# Patient Record
Sex: FEMALE | Race: WHITE | NOT HISPANIC OR LATINO | Employment: UNEMPLOYED | URBAN - METROPOLITAN AREA
[De-identification: names, ages, dates, MRNs, and addresses within clinical notes are randomized per-mention and may not be internally consistent; named-entity substitution may affect disease eponyms.]

---

## 2023-02-11 PROBLEM — B00.9 HERPES: Status: ACTIVE | Noted: 2023-02-11

## 2023-02-11 PROBLEM — S91.101A OPEN WOUND OF RIGHT GREAT TOE: Status: ACTIVE | Noted: 2023-02-11

## 2023-02-11 PROBLEM — R14.0 ABDOMINAL BLOATING: Status: ACTIVE | Noted: 2023-02-11

## 2023-02-11 PROBLEM — N63.20 BREAST MASS, LEFT: Status: ACTIVE | Noted: 2023-02-11

## 2023-02-11 PROBLEM — S91.339A: Status: ACTIVE | Noted: 2023-02-11

## 2023-02-11 PROBLEM — M21.612 BILATERAL BUNIONS: Status: ACTIVE | Noted: 2023-02-11

## 2023-02-11 PROBLEM — R10.84 GENERALIZED ABDOMINAL PAIN: Status: ACTIVE | Noted: 2023-02-11

## 2023-02-11 PROBLEM — L03.90 CELLULITIS: Status: ACTIVE | Noted: 2023-02-11

## 2023-02-11 PROBLEM — L70.9 ACNE: Status: ACTIVE | Noted: 2023-02-11

## 2023-02-11 PROBLEM — R92.8 ABNORMAL MAMMOGRAM: Status: ACTIVE | Noted: 2023-02-11

## 2023-02-11 PROBLEM — S99.929A FOOT INJURY: Status: ACTIVE | Noted: 2023-02-11

## 2023-02-11 PROBLEM — R06.83 SNORING: Status: ACTIVE | Noted: 2023-02-11

## 2023-02-11 PROBLEM — M21.611 BILATERAL BUNIONS: Status: ACTIVE | Noted: 2023-02-11

## 2023-03-14 ENCOUNTER — OFFICE VISIT (OUTPATIENT)
Dept: PRIMARY CARE | Facility: CLINIC | Age: 48
End: 2023-03-14
Payer: COMMERCIAL

## 2023-03-14 VITALS
SYSTOLIC BLOOD PRESSURE: 110 MMHG | TEMPERATURE: 97.7 F | BODY MASS INDEX: 22.56 KG/M2 | WEIGHT: 157.6 LBS | OXYGEN SATURATION: 98 % | RESPIRATION RATE: 16 BRPM | HEIGHT: 70 IN | HEART RATE: 76 BPM | DIASTOLIC BLOOD PRESSURE: 80 MMHG

## 2023-03-14 DIAGNOSIS — Z00.00 ENCOUNTER FOR ANNUAL PHYSICAL EXAM: ICD-10-CM

## 2023-03-14 DIAGNOSIS — M79.10 MYALGIA: Primary | ICD-10-CM

## 2023-03-14 DIAGNOSIS — M79.645 CHRONIC PAIN OF LEFT THUMB: ICD-10-CM

## 2023-03-14 DIAGNOSIS — M79.672 LEFT FOOT PAIN: ICD-10-CM

## 2023-03-14 DIAGNOSIS — N95.1 PERIMENOPAUSAL: ICD-10-CM

## 2023-03-14 DIAGNOSIS — G89.29 CHRONIC PAIN OF LEFT THUMB: ICD-10-CM

## 2023-03-14 PROCEDURE — 1036F TOBACCO NON-USER: CPT | Performed by: FAMILY MEDICINE

## 2023-03-14 PROCEDURE — 99386 PREV VISIT NEW AGE 40-64: CPT | Performed by: FAMILY MEDICINE

## 2023-03-14 PROCEDURE — 99203 OFFICE O/P NEW LOW 30 MIN: CPT | Performed by: FAMILY MEDICINE

## 2023-03-14 RX ORDER — VALACYCLOVIR HYDROCHLORIDE 500 MG/1
TABLET, FILM COATED ORAL
COMMUNITY
Start: 2017-07-21

## 2023-03-14 RX ORDER — TRIFAROTENE 50 UG/G
CREAM TOPICAL
COMMUNITY
Start: 2023-01-31 | End: 2024-02-13 | Stop reason: ALTCHOICE

## 2023-03-14 RX ORDER — DOXYCYCLINE 100 MG/1
100 CAPSULE ORAL 2 TIMES DAILY
COMMUNITY
Start: 2022-12-15 | End: 2024-02-13 | Stop reason: ALTCHOICE

## 2023-03-14 RX ORDER — METRONIDAZOLE 7.5 MG/G
CREAM TOPICAL
COMMUNITY
Start: 2023-03-09 | End: 2023-08-15 | Stop reason: WASHOUT

## 2023-03-14 ASSESSMENT — PATIENT HEALTH QUESTIONNAIRE - PHQ9
6. FEELING BAD ABOUT YOURSELF - OR THAT YOU ARE A FAILURE OR HAVE LET YOURSELF OR YOUR FAMILY DOWN: NOT AT ALL
2. FEELING DOWN, DEPRESSED OR HOPELESS: NOT AT ALL
5. POOR APPETITE OR OVEREATING: NOT AT ALL
4. FEELING TIRED OR HAVING LITTLE ENERGY: NOT AT ALL
8. MOVING OR SPEAKING SO SLOWLY THAT OTHER PEOPLE COULD HAVE NOTICED. OR THE OPPOSITE, BEING SO FIGETY OR RESTLESS THAT YOU HAVE BEEN MOVING AROUND A LOT MORE THAN USUAL: NOT AT ALL
SUM OF ALL RESPONSES TO PHQ QUESTIONS 1-9: 0
7. TROUBLE CONCENTRATING ON THINGS, SUCH AS READING THE NEWSPAPER OR WATCHING TELEVISION: NOT AT ALL
10. IF YOU CHECKED OFF ANY PROBLEMS, HOW DIFFICULT HAVE THESE PROBLEMS MADE IT FOR YOU TO DO YOUR WORK, TAKE CARE OF THINGS AT HOME, OR GET ALONG WITH OTHER PEOPLE: NOT DIFFICULT AT ALL
1. LITTLE INTEREST OR PLEASURE IN DOING THINGS: NOT AT ALL
3. TROUBLE FALLING OR STAYING ASLEEP OR SLEEPING TOO MUCH: NOT AT ALL
9. THOUGHTS THAT YOU WOULD BE BETTER OFF DEAD, OR OF HURTING YOURSELF: NOT AT ALL
SUM OF ALL RESPONSES TO PHQ9 QUESTIONS 1 AND 2: 0

## 2023-03-14 ASSESSMENT — ANXIETY QUESTIONNAIRES
6. BECOMING EASILY ANNOYED OR IRRITABLE: NOT AT ALL
4. TROUBLE RELAXING: NOT AT ALL
7. FEELING AFRAID AS IF SOMETHING AWFUL MIGHT HAPPEN: NOT AT ALL
2. NOT BEING ABLE TO STOP OR CONTROL WORRYING: NOT AT ALL
3. WORRYING TOO MUCH ABOUT DIFFERENT THINGS: NOT AT ALL
IF YOU CHECKED OFF ANY PROBLEMS ON THIS QUESTIONNAIRE, HOW DIFFICULT HAVE THESE PROBLEMS MADE IT FOR YOU TO DO YOUR WORK, TAKE CARE OF THINGS AT HOME, OR GET ALONG WITH OTHER PEOPLE: NOT DIFFICULT AT ALL
1. FEELING NERVOUS, ANXIOUS, OR ON EDGE: NOT AT ALL
GAD7 TOTAL SCORE: 0
5. BEING SO RESTLESS THAT IT IS HARD TO SIT STILL: NOT AT ALL

## 2023-03-14 NOTE — PROGRESS NOTES
"Subjective   Dilip Barnes is a 48 y.o. female who presents for Establish Care (PT being seen for NPV to establish care with new PCP ).  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 No cycle since dec until today was going to see gyn but cancelled since menses came     L foot pain occ    Went to PT, mesotherapy, chiro for months d/t herniated disc in back since oct   Current Outpatient Medications on File Prior to Visit   Medication Sig Dispense Refill    Aklief 0.005 % cream Apply half a pump to acne prone areas every other night as tolerated      doxycycline (Monodox) 100 mg capsule Take 1 capsule (100 mg) by mouth in the morning and 1 capsule (100 mg) before bedtime.      metroNIDAZOLE (Metrocream) 0.75 % cream Apply topically.      valACYclovir (Valtrex) 500 mg tablet Take by mouth.       No current facility-administered medications on file prior to visit.       Objective   /80   Pulse 76   Temp 36.5 °C (97.7 °F)   Resp 16   Ht 1.774 m (5' 9.84\")   Wt 71.5 kg (157 lb 9.6 oz)   SpO2 98%   BMI 22.72 kg/m²    Physical Exam  General: NAD  HEENT:NCAT, PERRLA, nml OP  Neck: no cervical NATHANIEL  Heart: RRR no murmur, no edema   Lungs: CTA b/l, no wheeze or rhonchi   GI: abd soft, nontender, nondistended.   MSK: no c/c/e. nml gait   PAIN AT LEFT THUMB BASE MILDLY + FINKELSTEIN, N OSWELLING OR WARMTH  NO REPRODUCIBLE L FOOT PAIN OR DEFORMITY   Skin: warm and dry  Psych: cooperative, appropriate affect  Neuro: speech clear. A&Ox3  Assessment/Plan   Problem List Items Addressed This Visit          Other    Encounter for annual physical exam     Other " Visit Diagnoses       Myalgia    -  Primary    Chronic pain of left thumb        Left foot pain            CPE:overall doing well. Discussed diet/ex changes  BMI: 22 at goal   VACC: reviewed  COLON CA SCRN: UTD rpt 11/2032, [facility]   LABS: ordered  PAP: utd per gyn, last pap was ASCUS neg HPV 8/2021  MAMMO: UTD rpt 9/2023 (done at [facility])  DEXA: never  RTC yearly or prn     L thumb pain: ?OA, check xray    Myalgias: check AI labs    L foot pain: mild. Monitor

## 2023-03-16 ENCOUNTER — APPOINTMENT (OUTPATIENT)
Dept: LAB | Facility: LAB | Age: 48
End: 2023-03-16
Payer: COMMERCIAL

## 2023-03-16 LAB
ALANINE AMINOTRANSFERASE (SGPT) (U/L) IN SER/PLAS: 11 U/L (ref 7–45)
ALBUMIN (G/DL) IN SER/PLAS: 4.5 G/DL (ref 3.4–5)
ALKALINE PHOSPHATASE (U/L) IN SER/PLAS: 49 U/L (ref 33–110)
ANION GAP IN SER/PLAS: 11 MMOL/L (ref 10–20)
ASPARTATE AMINOTRANSFERASE (SGOT) (U/L) IN SER/PLAS: 15 U/L (ref 9–39)
BASOPHILS (10*3/UL) IN BLOOD BY AUTOMATED COUNT: NORMAL
BASOPHILS/100 LEUKOCYTES IN BLOOD BY AUTOMATED COUNT: NORMAL
BILIRUBIN TOTAL (MG/DL) IN SER/PLAS: 0.7 MG/DL (ref 0–1.2)
C REACTIVE PROTEIN (MG/L) IN SER/PLAS: <0.1 MG/DL
CALCIDIOL (25 OH VITAMIN D3) (NG/ML) IN SER/PLAS: 32 NG/ML
CALCIUM (MG/DL) IN SER/PLAS: 9.4 MG/DL (ref 8.6–10.3)
CARBON DIOXIDE, TOTAL (MMOL/L) IN SER/PLAS: 29 MMOL/L (ref 21–32)
CHLORIDE (MMOL/L) IN SER/PLAS: 101 MMOL/L (ref 98–107)
CHOLESTEROL (MG/DL) IN SER/PLAS: 178 MG/DL (ref 0–199)
CHOLESTEROL IN HDL (MG/DL) IN SER/PLAS: 69.6 MG/DL
CHOLESTEROL/HDL RATIO: 2.6
COBALAMIN (VITAMIN B12) (PG/ML) IN SER/PLAS: 490 PG/ML (ref 211–911)
CREATININE (MG/DL) IN SER/PLAS: 0.78 MG/DL (ref 0.5–1.05)
EOSINOPHILS (10*3/UL) IN BLOOD BY AUTOMATED COUNT: NORMAL
EOSINOPHILS/100 LEUKOCYTES IN BLOOD BY AUTOMATED COUNT: NORMAL
ERYTHROCYTE DISTRIBUTION WIDTH (RATIO) BY AUTOMATED COUNT: NORMAL
ERYTHROCYTE MEAN CORPUSCULAR HEMOGLOBIN CONCENTRATION (G/DL) BY AUTOMATED: NORMAL
ERYTHROCYTE MEAN CORPUSCULAR VOLUME (FL) BY AUTOMATED COUNT: NORMAL
ERYTHROCYTES (10*6/UL) IN BLOOD BY AUTOMATED COUNT: NORMAL
ESTIMATED AVERAGE GLUCOSE FOR HBA1C: 97 MG/DL
ESTRADIOL (PG/ML) IN SER/PLAS: 148 PG/ML
FOLLITROPIN (IU/L) IN SER/PLAS: 15.3 IU/L
GFR FEMALE: >90 ML/MIN/1.73M2
GLUCOSE (MG/DL) IN SER/PLAS: 90 MG/DL (ref 74–99)
HEMATOCRIT (%) IN BLOOD BY AUTOMATED COUNT: NORMAL
HEMOGLOBIN (G/DL) IN BLOOD: NORMAL
HEMOGLOBIN A1C/HEMOGLOBIN TOTAL IN BLOOD: 5 %
IMMATURE GRANULOCYTES/100 LEUKOCYTES IN BLOOD BY AUTOMATED COUNT: NORMAL
IRON (UG/DL) IN SER/PLAS: 148 UG/DL (ref 35–150)
IRON BINDING CAPACITY (UG/DL) IN SER/PLAS: 372 UG/DL (ref 240–445)
IRON SATURATION (%) IN SER/PLAS: 40 % (ref 25–45)
LDL: 94 MG/DL (ref 0–99)
LEUKOCYTES (10*3/UL) IN BLOOD BY AUTOMATED COUNT: NORMAL
LUTEINIZING HORMONE (IU/ML) IN SER/PLAS: 5.8 IU/L
LYMPHOCYTES (10*3/UL) IN BLOOD BY AUTOMATED COUNT: NORMAL
LYMPHOCYTES/100 LEUKOCYTES IN BLOOD BY AUTOMATED COUNT: NORMAL
MANUAL DIFFERENTIAL Y/N: NORMAL
MONOCYTES (10*3/UL) IN BLOOD BY AUTOMATED COUNT: NORMAL
MONOCYTES/100 LEUKOCYTES IN BLOOD BY AUTOMATED COUNT: NORMAL
NEUTROPHILS (10*3/UL) IN BLOOD BY AUTOMATED COUNT: NORMAL
NEUTROPHILS/100 LEUKOCYTES IN BLOOD BY AUTOMATED COUNT: NORMAL
NRBC (PER 100 WBCS) BY AUTOMATED COUNT: NORMAL
PLATELETS (10*3/UL) IN BLOOD AUTOMATED COUNT: NORMAL
POTASSIUM (MMOL/L) IN SER/PLAS: 4.5 MMOL/L (ref 3.5–5.3)
PROTEIN TOTAL: 7 G/DL (ref 6.4–8.2)
RHEUMATOID FACTOR (IU/ML) IN SERUM OR PLASMA: <10 IU/ML (ref 0–15)
SEDIMENTATION RATE, ERYTHROCYTE: NORMAL
SODIUM (MMOL/L) IN SER/PLAS: 136 MMOL/L (ref 136–145)
THYROTROPIN (MIU/L) IN SER/PLAS BY DETECTION LIMIT <= 0.05 MIU/L: 2.1 MIU/L (ref 0.44–3.98)
TRIGLYCERIDE (MG/DL) IN SER/PLAS: 72 MG/DL (ref 0–149)
UREA NITROGEN (MG/DL) IN SER/PLAS: 9 MG/DL (ref 6–23)
VLDL: 14 MG/DL (ref 0–40)

## 2023-03-17 LAB
ANTI-CENTROMERE: <0.2 AI
ANTI-CHROMATIN: <0.2 AI
ANTI-DNA (DS): <1 IU/ML
ANTI-JO-1 IGG: <0.2 AI
ANTI-NUCLEAR ANTIBODY (ANA): NEGATIVE
ANTI-RIBOSOMAL P: <0.2 AI
ANTI-RNP: 0.2 AI
ANTI-SCL-70: 0.2 AI
ANTI-SM/RNP: <0.2 AI
ANTI-SM: <0.2 AI
ANTI-SSA: <0.2 AI
ANTI-SSB: <0.2 AI
IGG (MG/DL) IN SER/PLAS: 1010 MG/DL (ref 700–1600)

## 2023-03-20 LAB — CITRULLINE ANTIBODY, IGG: 4 UNITS (ref 0–19)

## 2023-08-15 ENCOUNTER — OFFICE VISIT (OUTPATIENT)
Dept: PRIMARY CARE | Facility: CLINIC | Age: 48
End: 2023-08-15
Payer: COMMERCIAL

## 2023-08-15 VITALS
OXYGEN SATURATION: 98 % | TEMPERATURE: 97.7 F | DIASTOLIC BLOOD PRESSURE: 72 MMHG | BODY MASS INDEX: 21.76 KG/M2 | HEIGHT: 70 IN | SYSTOLIC BLOOD PRESSURE: 100 MMHG | RESPIRATION RATE: 18 BRPM | WEIGHT: 152 LBS | HEART RATE: 62 BPM

## 2023-08-15 DIAGNOSIS — K43.9 VENTRAL HERNIA WITHOUT OBSTRUCTION OR GANGRENE: Primary | ICD-10-CM

## 2023-08-15 PROCEDURE — 1036F TOBACCO NON-USER: CPT | Performed by: FAMILY MEDICINE

## 2023-08-15 PROCEDURE — 99213 OFFICE O/P EST LOW 20 MIN: CPT | Performed by: FAMILY MEDICINE

## 2023-08-15 RX ORDER — CYANOCOBALAMIN (VITAMIN B-12) 1000 MCG
1 TABLET, EXTENDED RELEASE ORAL DAILY
COMMUNITY
Start: 2022-02-17

## 2023-08-15 RX ORDER — TURMERIC 400 MG
1 CAPSULE ORAL
COMMUNITY

## 2023-08-15 NOTE — PROGRESS NOTES
"Subjective   Dilip Barnes is a 48 y.o. female who presents for Follow-up (Abd hernia few weeks).  HPI  R central/mid abd fullness x few wks, worse when changing certain positions. No pain. No changes in bowels, urinary Sx, fever, changes in appetite.   Current Outpatient Medications on File Prior to Visit   Medication Sig Dispense Refill    L. acidophilus/Bifid. animalis (Daily Probiotic) 2.5 billion cell capsule Take 1 capsule by mouth once daily.      zinc-colostrum-egg-[id number] 15 mg-50 mg- 1 mg-1 mg capsule Take 2 capsules by mouth once daily.      Aklief 0.005 % cream Apply half a pump to acne prone areas every other night as tolerated      doxycycline (Monodox) 100 mg capsule Take 1 capsule (100 mg) by mouth in the morning and 1 capsule (100 mg) before bedtime.      turmeric 400 mg capsule Take 1 capsule by mouth once daily.      valACYclovir (Valtrex) 500 mg tablet Take by mouth.      [DISCONTINUED] metroNIDAZOLE (Metrocream) 0.75 % cream Apply topically.       No current facility-administered medications on file prior to visit.                  Objective   /72   Pulse 62   Temp 36.5 °C (97.7 °F)   Resp 18   Ht 1.778 m (5' 10\")   Wt 68.9 kg (152 lb)   SpO2 98%   BMI 21.81 kg/m²    Physical Exam  General: NAD  HEENT:NCAT, PERRLA, nml OP  Neck: no cervical NATHANIEL  Heart: RRR no murmur, no edema   Lungs: CTA b/l, no wheeze or rhonchi   GI: abd soft, nontender, nondistended. No reproducible hernia on exam at rest or valsalva   MSK: no c/c/e. nml gait   Skin: warm and dry  Psych: cooperative, appropriate affect  Neuro: speech clear. A&Ox3  Assessment/Plan   Problem List Items Addressed This Visit    None  Visit Diagnoses       Ventral hernia without obstruction or gangrene    -  Primary  -mildly symptomatic. No reproducible on exam. Discussed pro/con of imaging  -will consider/monitor Sx and if worsens pt will spring CT   -red flag signs and return precautions discussed.   -no activity restrictions.  "    Relevant Orders    CT abdomen pelvis w IV contrast

## 2023-12-27 ENCOUNTER — APPOINTMENT (OUTPATIENT)
Dept: RADIOLOGY | Facility: CLINIC | Age: 48
End: 2023-12-27
Payer: COMMERCIAL

## 2024-01-16 ENCOUNTER — ANCILLARY PROCEDURE (OUTPATIENT)
Dept: RADIOLOGY | Facility: CLINIC | Age: 49
End: 2024-01-16
Payer: COMMERCIAL

## 2024-01-16 VITALS — HEIGHT: 70 IN | WEIGHT: 151.9 LBS | BODY MASS INDEX: 21.75 KG/M2

## 2024-01-16 DIAGNOSIS — Z12.31 ENCOUNTER FOR SCREENING MAMMOGRAM FOR MALIGNANT NEOPLASM OF BREAST: ICD-10-CM

## 2024-01-16 PROCEDURE — 77067 SCR MAMMO BI INCL CAD: CPT | Performed by: RADIOLOGY

## 2024-01-16 PROCEDURE — 77063 BREAST TOMOSYNTHESIS BI: CPT | Performed by: RADIOLOGY

## 2024-01-16 PROCEDURE — 77067 SCR MAMMO BI INCL CAD: CPT

## 2024-01-17 ENCOUNTER — HOSPITAL ENCOUNTER (OUTPATIENT)
Dept: RADIOLOGY | Facility: EXTERNAL LOCATION | Age: 49
Discharge: HOME | End: 2024-01-17

## 2024-02-13 ENCOUNTER — OFFICE VISIT (OUTPATIENT)
Dept: PRIMARY CARE | Facility: CLINIC | Age: 49
End: 2024-02-13
Payer: COMMERCIAL

## 2024-02-13 VITALS
SYSTOLIC BLOOD PRESSURE: 115 MMHG | BODY MASS INDEX: 21.76 KG/M2 | RESPIRATION RATE: 16 BRPM | HEIGHT: 70 IN | HEART RATE: 83 BPM | WEIGHT: 152 LBS | TEMPERATURE: 97.3 F | OXYGEN SATURATION: 94 % | DIASTOLIC BLOOD PRESSURE: 70 MMHG

## 2024-02-13 DIAGNOSIS — N93.9 ABNORMAL UTERINE BLEEDING (AUB): ICD-10-CM

## 2024-02-13 DIAGNOSIS — R53.82 CHRONIC FATIGUE: ICD-10-CM

## 2024-02-13 DIAGNOSIS — Z00.00 ENCOUNTER FOR ANNUAL PHYSICAL EXAM: ICD-10-CM

## 2024-02-13 DIAGNOSIS — M26.621 TMJ TENDERNESS, RIGHT: ICD-10-CM

## 2024-02-13 DIAGNOSIS — N80.03 ADENOMYOSIS: ICD-10-CM

## 2024-02-13 DIAGNOSIS — K43.9 ABDOMINAL WALL HERNIA: ICD-10-CM

## 2024-02-13 DIAGNOSIS — N95.1 PERIMENOPAUSAL: Primary | ICD-10-CM

## 2024-02-13 PROCEDURE — 1036F TOBACCO NON-USER: CPT | Performed by: FAMILY MEDICINE

## 2024-02-13 PROCEDURE — 99214 OFFICE O/P EST MOD 30 MIN: CPT | Performed by: FAMILY MEDICINE

## 2024-02-13 NOTE — PROGRESS NOTES
"Subjective   Dilip Barnes is a 49 y.o. female who presents for Annual Exam.  HPI      Wants tested for \"everything\" taking a lot of supp.     Using a Annas wild yam cream from [place] to help w cycles.  Period restarted 3 d ago after NO MENSES SINCE NOV. Used the cream for about a mo. Did several rounds of progresterone last yr and did not like the SE prefers natural methods.     R jaw pain w clenching. Tx TMJ yrs ago.     Tries to antiinflamm diet, castor oil helps w hands.     Bump in L lower post ribs for many yrs that comes out w valsalva   Current Outpatient Medications on File Prior to Visit   Medication Sig Dispense Refill    L. acidophilus/Bifid. animalis (Daily Probiotic) 2.5 billion cell capsule Take 1 capsule by mouth once daily.      turmeric 400 mg capsule Take 1 capsule by mouth once daily.      zinc-colostrum-egg-[id number] 15 mg-50 mg- 1 mg-1 mg capsule Take 2 capsules by mouth once daily.      valACYclovir (Valtrex) 500 mg tablet Take by mouth.      [DISCONTINUED] Aklief 0.005 % cream Apply half a pump to acne prone areas every other night as tolerated      [DISCONTINUED] doxycycline (Monodox) 100 mg capsule Take 1 capsule (100 mg) by mouth in the morning and 1 capsule (100 mg) before bedtime.       No current facility-administered medications on file prior to visit.                  Objective   /70 (BP Location: Left arm)   Pulse 83   Temp 36.3 °C (97.3 °F)   Resp 16   Ht 1.788 m (5' 10.39\")   Wt 68.9 kg (152 lb)   SpO2 94%   BMI 21.57 kg/m²    Physical Exam  General: NAD  HEENT:NCAT, PERRLA, nml OP  Neck: no cervical NATHANIEL  Heart: RRR no murmur, no edema   Lungs: CTA b/l, no wheeze or rhonchi   GI: abd soft, nontender, nondistended.   Fullness in posterior abd/lower rib area w valsalva soft and not present at rest   MSK: no c/c/e. nml gait   R TMJ tenderness w ROM and palpation   Skin: warm and dry  Psych: cooperative, appropriate affect  Neuro: speech clear. " A&Ox3  Assessment/Plan   Problem List Items Addressed This Visit       Encounter for annual physical exam  CPE 1-2 mo labs prior     Relevant Orders    TSH with reflex to Free T4 if abnormal    Lipid Panel    CBC and Auto Differential    Hemoglobin A1C    Comprehensive Metabolic Panel     Other Visit Diagnoses       Perimenopausal    -  Primary  Emely yessenia vs AUB   EMBx last yr nml   TVUS uterine adenomyosis 4/2023   Check hormone levels  If not in perimeno range, will do TVUS   Offered GYN RF will hold off for now     Relevant Orders    FSH & LH    Estradiol    Adenomyosis      Could be contributing to AUB      Abnormal uterine bleeding (AUB)      See above     Relevant Orders    FSH & LH    Estradiol    TSH with reflex to Free T4 if abnormal    Iron and TIBC    Chronic fatigue      Check Vit levels  Not on Fe     Relevant Orders    Vitamin D 25 hydroxy    Ferritin    Vitamin B12    R TMJ dysfnc: rec jaw rest, voltaren gel/martinez oil,  nsaids prn

## 2024-02-27 NOTE — PROGRESS NOTES
Subjective   Patient ID: Dilip Barnes is a 49 y.o. female who presents for irregular periods and annual exam.    PAP 8-13-21 ASCUS, HPV NEG  MAMMO 1-16-24  DEXA NEVER  COLON 11-16-22  GARDASIL never declines.    H/O AUB, was on Provera this past year to cycle.   4/6/23 ultrasound-adenomyosis, 9 mm.  4/17/23 dyssynchronous secretory phase endometrium.    Did 2 rounds of progesterone, then stopped and had regular cycles. Didn't get period in December/January. Started a Yam cream. Patient has gone holistic. Got a little blood after using it. First few days light then 2/14/24 a regular period until today.      Mirena out Nov. 2014. Vasectomy.      Off Valtrex, no outbreaks.  doesn't have it. Uses peppermint oil.     HEalthy year. Kids 5th, 6th grade. Twins in college([employer]).     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       H/O AUB, was on Provera this past year to cycle.   4/6/23 ultrasound-adenomyosis, 9 mm.  4/17/23 dyssynchronous secretory phase endometrium.      Review of Systems   All other systems reviewed and are negative.      Objective   Constitutional: Alert and in no acute distress. Well developed, well nourished.  Neck: no neck asymmetry. Supple and thyroid not enlarged and there were no palpable thyroid nodules.  Chest: Breasts normal appearance, no nipple discharge and no skin changes and palpation of breasts and axillae: no palpable mass and no axillary lymphadenopathy.  Abdomen: soft nontender; no abdominal mass palpated, no organomegaly and no hernias.  Genitourinary: external genitalia: normal, no inguinal lymphadenopathy, Bartholin's urethral and [place]'s glands: normal, urethra: normal and bladder: normal on palpation.  Vagina: normal.  Cervix: normal.  Uterus: normal.   Right adnexa/parametria: normal.  Left adnexa/parametria: normal.  Skin: normal skin color and pigmentation, normal skin turgor and no rash.  Psychiatric: alert and oriented x 3, affect normal to  patient baseline and mood appropriate.     Assessment/Plan   -PAP-HPV  -angelica-neeta  -Patient willing to try cycling with Progesterone 200mg. Testing done this past year will not repeat unless she continues to have issues.

## 2024-02-28 ENCOUNTER — LAB (OUTPATIENT)
Dept: LAB | Facility: LAB | Age: 49
End: 2024-02-28
Payer: COMMERCIAL

## 2024-02-28 DIAGNOSIS — N93.9 ABNORMAL UTERINE BLEEDING (AUB): ICD-10-CM

## 2024-02-28 DIAGNOSIS — N95.1 PERIMENOPAUSAL: ICD-10-CM

## 2024-02-28 DIAGNOSIS — R53.82 CHRONIC FATIGUE: ICD-10-CM

## 2024-02-28 DIAGNOSIS — M79.10 MYALGIA: ICD-10-CM

## 2024-02-28 DIAGNOSIS — Z00.00 ENCOUNTER FOR ANNUAL PHYSICAL EXAM: ICD-10-CM

## 2024-02-28 LAB
25(OH)D3 SERPL-MCNC: 34 NG/ML (ref 30–100)
ALBUMIN SERPL BCP-MCNC: 4.2 G/DL (ref 3.4–5)
ALP SERPL-CCNC: 51 U/L (ref 33–110)
ALT SERPL W P-5'-P-CCNC: 16 U/L (ref 7–45)
ANION GAP SERPL CALC-SCNC: 16 MMOL/L (ref 10–20)
AST SERPL W P-5'-P-CCNC: 20 U/L (ref 9–39)
BASOPHILS # BLD AUTO: 0.04 X10*3/UL (ref 0–0.1)
BASOPHILS NFR BLD AUTO: 0.6 %
BILIRUB SERPL-MCNC: 0.5 MG/DL (ref 0–1.2)
BUN SERPL-MCNC: 9 MG/DL (ref 6–23)
CALCIUM SERPL-MCNC: 9.3 MG/DL (ref 8.6–10.6)
CCP IGG SERPL-ACNC: <1 U/ML
CHLORIDE SERPL-SCNC: 101 MMOL/L (ref 98–107)
CHOLEST SERPL-MCNC: 187 MG/DL (ref 0–199)
CHOLESTEROL/HDL RATIO: 2.4
CO2 SERPL-SCNC: 26 MMOL/L (ref 21–32)
CREAT SERPL-MCNC: 0.65 MG/DL (ref 0.5–1.05)
CRP SERPL-MCNC: <0.1 MG/DL
EGFRCR SERPLBLD CKD-EPI 2021: >90 ML/MIN/1.73M*2
EOSINOPHIL # BLD AUTO: 0.2 X10*3/UL (ref 0–0.7)
EOSINOPHIL NFR BLD AUTO: 3.1 %
ERYTHROCYTE [DISTWIDTH] IN BLOOD BY AUTOMATED COUNT: 13 % (ref 11.5–14.5)
ERYTHROCYTE [SEDIMENTATION RATE] IN BLOOD BY WESTERGREN METHOD: 3 MM/H (ref 0–20)
EST. AVERAGE GLUCOSE BLD GHB EST-MCNC: 94 MG/DL
ESTRADIOL SERPL-MCNC: 227 PG/ML
FERRITIN SERPL-MCNC: 27 NG/ML (ref 8–150)
FSH SERPL-ACNC: 9 IU/L
GLUCOSE SERPL-MCNC: 79 MG/DL (ref 74–99)
HBA1C MFR BLD: 4.9 %
HCT VFR BLD AUTO: 41.2 % (ref 36–46)
HDLC SERPL-MCNC: 76.6 MG/DL
HGB BLD-MCNC: 13.2 G/DL (ref 12–16)
IMM GRANULOCYTES # BLD AUTO: 0.01 X10*3/UL (ref 0–0.7)
IMM GRANULOCYTES NFR BLD AUTO: 0.2 % (ref 0–0.9)
IRON SATN MFR SERPL: 26 % (ref 25–45)
IRON SERPL-MCNC: 96 UG/DL (ref 35–150)
LDLC SERPL CALC-MCNC: 99 MG/DL
LH SERPL-ACNC: 4.8 IU/L
LYMPHOCYTES # BLD AUTO: 1.91 X10*3/UL (ref 1.2–4.8)
LYMPHOCYTES NFR BLD AUTO: 29.8 %
MCH RBC QN AUTO: 31.2 PG (ref 26–34)
MCHC RBC AUTO-ENTMCNC: 32 G/DL (ref 32–36)
MCV RBC AUTO: 97 FL (ref 80–100)
MONOCYTES # BLD AUTO: 0.62 X10*3/UL (ref 0.1–1)
MONOCYTES NFR BLD AUTO: 9.7 %
NEUTROPHILS # BLD AUTO: 3.62 X10*3/UL (ref 1.2–7.7)
NEUTROPHILS NFR BLD AUTO: 56.6 %
NON HDL CHOLESTEROL: 110 MG/DL (ref 0–149)
NRBC BLD-RTO: 0 /100 WBCS (ref 0–0)
PLATELET # BLD AUTO: 254 X10*3/UL (ref 150–450)
POTASSIUM SERPL-SCNC: 4.5 MMOL/L (ref 3.5–5.3)
PROT SERPL-MCNC: 6.7 G/DL (ref 6.4–8.2)
RBC # BLD AUTO: 4.23 X10*6/UL (ref 4–5.2)
RHEUMATOID FACT SER NEPH-ACNC: <10 IU/ML (ref 0–15)
SODIUM SERPL-SCNC: 138 MMOL/L (ref 136–145)
TIBC SERPL-MCNC: 372 UG/DL (ref 240–445)
TRIGL SERPL-MCNC: 59 MG/DL (ref 0–149)
TSH SERPL-ACNC: 1.18 MIU/L (ref 0.44–3.98)
UIBC SERPL-MCNC: 276 UG/DL (ref 110–370)
VIT B12 SERPL-MCNC: 604 PG/ML (ref 211–911)
VLDL: 12 MG/DL (ref 0–40)
WBC # BLD AUTO: 6.4 X10*3/UL (ref 4.4–11.3)

## 2024-02-28 PROCEDURE — 86140 C-REACTIVE PROTEIN: CPT

## 2024-02-28 PROCEDURE — 80061 LIPID PANEL: CPT

## 2024-02-28 PROCEDURE — 86431 RHEUMATOID FACTOR QUANT: CPT

## 2024-02-28 PROCEDURE — 80053 COMPREHEN METABOLIC PANEL: CPT

## 2024-02-28 PROCEDURE — 86038 ANTINUCLEAR ANTIBODIES: CPT

## 2024-02-28 PROCEDURE — 82607 VITAMIN B-12: CPT

## 2024-02-28 PROCEDURE — 84443 ASSAY THYROID STIM HORMONE: CPT

## 2024-02-28 PROCEDURE — 83540 ASSAY OF IRON: CPT

## 2024-02-28 PROCEDURE — 86200 CCP ANTIBODY: CPT

## 2024-02-28 PROCEDURE — 85652 RBC SED RATE AUTOMATED: CPT

## 2024-02-28 PROCEDURE — 36415 COLL VENOUS BLD VENIPUNCTURE: CPT

## 2024-02-28 PROCEDURE — 82728 ASSAY OF FERRITIN: CPT

## 2024-02-28 PROCEDURE — 83001 ASSAY OF GONADOTROPIN (FSH): CPT

## 2024-02-28 PROCEDURE — 82670 ASSAY OF TOTAL ESTRADIOL: CPT

## 2024-02-28 PROCEDURE — 83550 IRON BINDING TEST: CPT

## 2024-02-28 PROCEDURE — 85025 COMPLETE CBC W/AUTO DIFF WBC: CPT

## 2024-02-28 PROCEDURE — 82306 VITAMIN D 25 HYDROXY: CPT

## 2024-02-28 PROCEDURE — 83002 ASSAY OF GONADOTROPIN (LH): CPT

## 2024-02-28 PROCEDURE — 83036 HEMOGLOBIN GLYCOSYLATED A1C: CPT

## 2024-02-29 ENCOUNTER — OFFICE VISIT (OUTPATIENT)
Dept: OBSTETRICS AND GYNECOLOGY | Facility: CLINIC | Age: 49
End: 2024-02-29
Payer: COMMERCIAL

## 2024-02-29 VITALS
DIASTOLIC BLOOD PRESSURE: 60 MMHG | SYSTOLIC BLOOD PRESSURE: 110 MMHG | WEIGHT: 151 LBS | HEIGHT: 70 IN | BODY MASS INDEX: 21.62 KG/M2

## 2024-02-29 DIAGNOSIS — Z12.31 ENCOUNTER FOR SCREENING MAMMOGRAM FOR BREAST CANCER: ICD-10-CM

## 2024-02-29 DIAGNOSIS — Z12.4 CERVICAL CANCER SCREENING: ICD-10-CM

## 2024-02-29 DIAGNOSIS — Z01.419 WELL WOMAN EXAM WITH ROUTINE GYNECOLOGICAL EXAM: ICD-10-CM

## 2024-02-29 DIAGNOSIS — N93.9 ABNORMAL UTERINE BLEEDING (AUB): Primary | ICD-10-CM

## 2024-02-29 PROCEDURE — 87624 HPV HI-RISK TYP POOLED RSLT: CPT

## 2024-02-29 PROCEDURE — 88142 CYTOPATH C/V THIN LAYER: CPT

## 2024-02-29 PROCEDURE — 99396 PREV VISIT EST AGE 40-64: CPT | Performed by: OBSTETRICS & GYNECOLOGY

## 2024-02-29 PROCEDURE — 1036F TOBACCO NON-USER: CPT | Performed by: OBSTETRICS & GYNECOLOGY

## 2024-02-29 RX ORDER — PROGESTERONE 200 MG/1
200 CAPSULE ORAL NIGHTLY
Qty: 12 CAPSULE | Refills: 11 | Status: SHIPPED | OUTPATIENT
Start: 2024-02-29 | End: 2025-02-28

## 2024-02-29 RX ORDER — FERROUS SULFATE 325(65) MG
325 TABLET, DELAYED RELEASE (ENTERIC COATED) ORAL DAILY
COMMUNITY

## 2024-03-01 LAB — ANA SER QL HEP2 SUBST: NEGATIVE

## 2024-03-08 ENCOUNTER — TELEPHONE (OUTPATIENT)
Dept: OBSTETRICS AND GYNECOLOGY | Facility: CLINIC | Age: 49
End: 2024-03-08
Payer: COMMERCIAL

## 2024-03-08 NOTE — TELEPHONE ENCOUNTER
Called and let patient know what Dr Olvera said - not abnormal to have breakthrough bleeding since recently starting progesterone.   
English

## 2024-03-12 ENCOUNTER — TELEPHONE (OUTPATIENT)
Dept: OBSTETRICS AND GYNECOLOGY | Facility: CLINIC | Age: 49
End: 2024-03-12
Payer: COMMERCIAL

## 2024-03-12 DIAGNOSIS — N93.9 ABNORMAL UTERINE BLEEDING (AUB): Primary | ICD-10-CM

## 2024-03-12 LAB
CYTOLOGY CMNT CVX/VAG CYTO-IMP: NORMAL
HPV HR 12 DNA GENITAL QL NAA+PROBE: NEGATIVE
HPV HR GENOTYPES PNL CVX NAA+PROBE: NEGATIVE
HPV16 DNA SPEC QL NAA+PROBE: NEGATIVE
HPV18 DNA SPEC QL NAA+PROBE: NEGATIVE
LAB AP HPV GENOTYPE QUESTION: YES
LAB AP HPV HR: NORMAL
LABORATORY COMMENT REPORT: NORMAL
LABORATORY COMMENT REPORT: NORMAL
LMP START DATE: NORMAL
PATH REPORT.TOTAL CANCER: NORMAL

## 2024-03-12 NOTE — TELEPHONE ENCOUNTER
TC with pt.   No menses Dec and Jan. Started bleeding in Feb and when saw Dr. Herrera 2/29 was still bleeding. Put on Progesterone for 12 days. Stopped bleeding for 8 days then restarted bleeding 8 days into progesterone. Took last progesterone yesterday. Still bleeding but may be tapering. Pt goes to [place] on 3/25/2024 and is hoping to have this bleeding in control by then. Pt does not like to take meds. Recommend check CBC, wait it out to see if bleeding tapers. Notified pt may increase in next two days since progesterone stopped yesterday. Pt aware she can call me with updates this week and through the weekend.

## 2024-04-04 ENCOUNTER — APPOINTMENT (OUTPATIENT)
Dept: PRIMARY CARE | Facility: CLINIC | Age: 49
End: 2024-04-04
Payer: COMMERCIAL

## 2024-04-11 ENCOUNTER — OFFICE VISIT (OUTPATIENT)
Dept: PRIMARY CARE | Facility: CLINIC | Age: 49
End: 2024-04-11
Payer: COMMERCIAL

## 2024-04-11 VITALS
BODY MASS INDEX: 21.67 KG/M2 | OXYGEN SATURATION: 95 % | SYSTOLIC BLOOD PRESSURE: 103 MMHG | DIASTOLIC BLOOD PRESSURE: 69 MMHG | RESPIRATION RATE: 16 BRPM | WEIGHT: 151.4 LBS | HEART RATE: 83 BPM | HEIGHT: 70 IN | TEMPERATURE: 97.8 F

## 2024-04-11 DIAGNOSIS — F51.02 ADJUSTMENT INSOMNIA: Primary | ICD-10-CM

## 2024-04-11 PROCEDURE — 99214 OFFICE O/P EST MOD 30 MIN: CPT | Performed by: FAMILY MEDICINE

## 2024-04-11 PROCEDURE — 1036F TOBACCO NON-USER: CPT | Performed by: FAMILY MEDICINE

## 2024-04-11 RX ORDER — TRAZODONE HYDROCHLORIDE 50 MG/1
50 TABLET ORAL NIGHTLY PRN
Qty: 30 TABLET | Refills: 1 | Status: SHIPPED | OUTPATIENT
Start: 2024-04-11 | End: 2025-04-11

## 2024-04-11 NOTE — PROGRESS NOTES
"Subjective   Dilip Barnes is a 49 y.o. female who presents for Insomnia (For a week ).  HPI    Not sleeping after red eye last week from [place]. Slept total 9 hrs in last 5 d. Trying all sleep techniques, meditation, sleepy tea. Melatonin. Took hydroxyzine 25 mg last night at 10 pm did not sleep then took another dose at 1 am.   Taking mag gly. Feeling head jolts. Some head pressure L side. No ringing in the ears.   No cough congestion ST. No naps   Current Outpatient Medications on File Prior to Visit   Medication Sig Dispense Refill    ferrous sulfate 325 (65 Fe) MG EC tablet Take 1 tablet by mouth once daily. Do not crush, chew, or split.      L. acidophilus/Bifid. animalis (Daily Probiotic) 2.5 billion cell capsule Take 1 capsule by mouth once daily.      turmeric 400 mg capsule Take 1 capsule by mouth once daily.      progesterone (Prometrium) 200 mg capsule Take 1 capsule (200 mg) by mouth once daily at bedtime. (Patient not taking: Reported on 4/11/2024) 12 capsule 11    valACYclovir (Valtrex) 500 mg tablet Take by mouth.      zinc-colostrum-egg-[id number] 15 mg-50 mg- 1 mg-1 mg capsule Take 2 capsules by mouth once daily.       No current facility-administered medications on file prior to visit.                  Objective   /69 (BP Location: Left arm)   Pulse 83   Temp 36.6 °C (97.8 °F)   Resp 16   Ht 1.778 m (5' 10\")   Wt 68.7 kg (151 lb 6.4 oz)   SpO2 95%   BMI 21.72 kg/m²    Physical Exam  General: NAD  HEENT:NCAT, PERRLA, nml OP, b/l TM clear   Neck: no cervical NATHANIEL  Heart: RRR no murmur, no edema   Lungs: CTA b/l, no wheeze or rhonchi   MSK: no c/c/e. nml gait   Skin: warm and dry  Psych: cooperative, appropriate affect  Neuro: speech clear. A&Ox3  Assessment/Plan   Problem List Items Addressed This Visit    None  Visit Diagnoses       Adjustment insomnia    -  Primary  Adjustment insomnia after recent trip  Hydroxyzine w hangover effect  Start trazodone 50 mg can inc up to 150   Cont w " sleep hygiene  Call if no improvement in 1 wk   DC mag gly d/t SE     Relevant Medications    traZODone (Desyrel) 50 mg tablet

## 2024-04-15 ENCOUNTER — TELEPHONE (OUTPATIENT)
Dept: PRIMARY CARE | Facility: CLINIC | Age: 49
End: 2024-04-15
Payer: COMMERCIAL

## 2024-04-15 NOTE — TELEPHONE ENCOUNTER
Patient called in asked if she could be seen, called or set up for virtual visit, regarding an issues she spoke w/ you about at her last visit, Advise?

## 2025-02-24 DIAGNOSIS — Z00.00 ENCOUNTER FOR ANNUAL PHYSICAL EXAM: ICD-10-CM

## 2025-04-09 ENCOUNTER — TELEPHONE (OUTPATIENT)
Dept: PRIMARY CARE | Facility: CLINIC | Age: 50
End: 2025-04-09

## 2025-04-22 ENCOUNTER — APPOINTMENT (OUTPATIENT)
Dept: PRIMARY CARE | Facility: CLINIC | Age: 50
End: 2025-04-22
Payer: COMMERCIAL